# Patient Record
Sex: FEMALE | Race: WHITE | NOT HISPANIC OR LATINO | Employment: OTHER
[De-identification: names, ages, dates, MRNs, and addresses within clinical notes are randomized per-mention and may not be internally consistent; named-entity substitution may affect disease eponyms.]

---

## 2020-01-29 ENCOUNTER — TRANSCRIBE ORDERS (OUTPATIENT)
Dept: OTHER | Age: 70
End: 2020-01-29

## 2020-01-29 DIAGNOSIS — R20.0 NUMBNESS IN BOTH HANDS: Primary | ICD-10-CM

## 2021-07-01 ENCOUNTER — RECORDS - HEALTHEAST (OUTPATIENT)
Dept: LAB | Facility: CLINIC | Age: 71
End: 2021-07-01

## 2021-07-01 LAB
SARS-COV-2 PCR COMMENT: NORMAL
SARS-COV-2 RNA SPEC QL NAA+PROBE: NEGATIVE
SARS-COV-2 VIRUS SPECIMEN SOURCE: NORMAL

## 2021-07-26 ENCOUNTER — LAB REQUISITION (OUTPATIENT)
Dept: LAB | Facility: CLINIC | Age: 71
End: 2021-07-26
Payer: MEDICARE

## 2021-07-26 DIAGNOSIS — R60.9 EDEMA, UNSPECIFIED: ICD-10-CM

## 2021-07-26 DIAGNOSIS — R60.1 GENERALIZED EDEMA: ICD-10-CM

## 2021-07-27 LAB
ANION GAP SERPL CALCULATED.3IONS-SCNC: 10 MMOL/L (ref 5–18)
BUN SERPL-MCNC: 12 MG/DL (ref 8–28)
CALCIUM SERPL-MCNC: 9.3 MG/DL (ref 8.5–10.5)
CHLORIDE BLD-SCNC: 107 MMOL/L (ref 98–107)
CO2 SERPL-SCNC: 25 MMOL/L (ref 22–31)
CREAT SERPL-MCNC: 0.68 MG/DL (ref 0.6–1.1)
GFR SERPL CREATININE-BSD FRML MDRD: 89 ML/MIN/1.73M2
GLUCOSE BLD-MCNC: 103 MG/DL (ref 70–125)
MAGNESIUM SERPL-MCNC: 1.9 MG/DL (ref 1.8–2.6)
POTASSIUM BLD-SCNC: 4.4 MMOL/L (ref 3.5–5)
SODIUM SERPL-SCNC: 142 MMOL/L (ref 136–145)

## 2021-07-27 PROCEDURE — 83735 ASSAY OF MAGNESIUM: CPT | Mod: ORL | Performed by: INTERNAL MEDICINE

## 2021-07-27 PROCEDURE — 36415 COLL VENOUS BLD VENIPUNCTURE: CPT | Performed by: INTERNAL MEDICINE

## 2021-07-27 PROCEDURE — P9604 ONE-WAY ALLOW PRORATED TRIP: HCPCS | Mod: ORL | Performed by: INTERNAL MEDICINE

## 2021-07-27 PROCEDURE — 80048 BASIC METABOLIC PNL TOTAL CA: CPT | Mod: ORL | Performed by: INTERNAL MEDICINE

## 2021-08-02 ENCOUNTER — LAB REQUISITION (OUTPATIENT)
Dept: LAB | Facility: CLINIC | Age: 71
End: 2021-08-02
Payer: MEDICARE

## 2021-08-02 DIAGNOSIS — U07.1 COVID-19: ICD-10-CM

## 2021-08-03 LAB — SARS-COV-2 RNA RESP QL NAA+PROBE: NEGATIVE

## 2021-08-03 PROCEDURE — U0003 INFECTIOUS AGENT DETECTION BY NUCLEIC ACID (DNA OR RNA); SEVERE ACUTE RESPIRATORY SYNDROME CORONAVIRUS 2 (SARS-COV-2) (CORONAVIRUS DISEASE [COVID-19]), AMPLIFIED PROBE TECHNIQUE, MAKING USE OF HIGH THROUGHPUT TECHNOLOGIES AS DESCRIBED BY CMS-2020-01-R: HCPCS | Performed by: FAMILY MEDICINE

## 2021-08-04 ENCOUNTER — LAB REQUISITION (OUTPATIENT)
Dept: LAB | Facility: CLINIC | Age: 71
End: 2021-08-04
Payer: MEDICARE

## 2021-08-04 DIAGNOSIS — U07.1 COVID-19: ICD-10-CM

## 2021-08-05 PROCEDURE — U0003 INFECTIOUS AGENT DETECTION BY NUCLEIC ACID (DNA OR RNA); SEVERE ACUTE RESPIRATORY SYNDROME CORONAVIRUS 2 (SARS-COV-2) (CORONAVIRUS DISEASE [COVID-19]), AMPLIFIED PROBE TECHNIQUE, MAKING USE OF HIGH THROUGHPUT TECHNOLOGIES AS DESCRIBED BY CMS-2020-01-R: HCPCS | Mod: ORL | Performed by: FAMILY MEDICINE

## 2021-08-06 ENCOUNTER — LAB REQUISITION (OUTPATIENT)
Dept: LAB | Facility: CLINIC | Age: 71
End: 2021-08-06
Payer: MEDICARE

## 2021-08-06 DIAGNOSIS — U07.1 COVID-19: ICD-10-CM

## 2021-08-06 LAB — SARS-COV-2 RNA RESP QL NAA+PROBE: NEGATIVE

## 2021-08-09 PROCEDURE — U0005 INFEC AGEN DETEC AMPLI PROBE: HCPCS | Mod: ORL | Performed by: FAMILY MEDICINE

## 2021-08-10 ENCOUNTER — LAB REQUISITION (OUTPATIENT)
Dept: LAB | Facility: CLINIC | Age: 71
End: 2021-08-10
Payer: MEDICARE

## 2021-08-10 DIAGNOSIS — U07.1 COVID-19: ICD-10-CM

## 2021-08-10 LAB — SARS-COV-2 RNA RESP QL NAA+PROBE: NEGATIVE

## 2021-08-12 ENCOUNTER — LAB REQUISITION (OUTPATIENT)
Dept: LAB | Facility: CLINIC | Age: 71
End: 2021-08-12

## 2021-08-12 DIAGNOSIS — U07.1 COVID-19: ICD-10-CM

## 2021-08-12 PROCEDURE — U0003 INFECTIOUS AGENT DETECTION BY NUCLEIC ACID (DNA OR RNA); SEVERE ACUTE RESPIRATORY SYNDROME CORONAVIRUS 2 (SARS-COV-2) (CORONAVIRUS DISEASE [COVID-19]), AMPLIFIED PROBE TECHNIQUE, MAKING USE OF HIGH THROUGHPUT TECHNOLOGIES AS DESCRIBED BY CMS-2020-01-R: HCPCS | Performed by: FAMILY MEDICINE

## 2021-08-13 LAB — SARS-COV-2 RNA RESP QL NAA+PROBE: NEGATIVE

## 2021-08-16 ENCOUNTER — LAB REQUISITION (OUTPATIENT)
Dept: LAB | Facility: CLINIC | Age: 71
End: 2021-08-16
Payer: MEDICARE

## 2021-08-16 DIAGNOSIS — R60.9 EDEMA, UNSPECIFIED: ICD-10-CM

## 2021-08-17 ENCOUNTER — LAB REQUISITION (OUTPATIENT)
Dept: LAB | Facility: CLINIC | Age: 71
End: 2021-08-17
Payer: MEDICARE

## 2021-08-17 ENCOUNTER — LAB REQUISITION (OUTPATIENT)
Dept: LAB | Facility: CLINIC | Age: 71
End: 2021-08-17

## 2021-08-17 DIAGNOSIS — U07.1 COVID-19: ICD-10-CM

## 2021-08-17 PROCEDURE — U0003 INFECTIOUS AGENT DETECTION BY NUCLEIC ACID (DNA OR RNA); SEVERE ACUTE RESPIRATORY SYNDROME CORONAVIRUS 2 (SARS-COV-2) (CORONAVIRUS DISEASE [COVID-19]), AMPLIFIED PROBE TECHNIQUE, MAKING USE OF HIGH THROUGHPUT TECHNOLOGIES AS DESCRIBED BY CMS-2020-01-R: HCPCS | Mod: ORL | Performed by: FAMILY MEDICINE

## 2021-08-18 ENCOUNTER — LAB REQUISITION (OUTPATIENT)
Dept: LAB | Facility: CLINIC | Age: 71
End: 2021-08-18
Payer: MEDICARE

## 2021-08-18 LAB
ANION GAP SERPL CALCULATED.3IONS-SCNC: 9 MMOL/L (ref 5–18)
BUN SERPL-MCNC: 12 MG/DL (ref 8–28)
CALCIUM SERPL-MCNC: 9.4 MG/DL (ref 8.5–10.5)
CHLORIDE BLD-SCNC: 106 MMOL/L (ref 98–107)
CO2 SERPL-SCNC: 26 MMOL/L (ref 22–31)
CREAT SERPL-MCNC: 0.65 MG/DL (ref 0.6–1.1)
GFR SERPL CREATININE-BSD FRML MDRD: 90 ML/MIN/1.73M2
GLUCOSE BLD-MCNC: 105 MG/DL (ref 70–125)
POTASSIUM BLD-SCNC: 4.5 MMOL/L (ref 3.5–5)
SARS-COV-2 RNA RESP QL NAA+PROBE: NEGATIVE
SODIUM SERPL-SCNC: 141 MMOL/L (ref 136–145)
T4 FREE SERPL-MCNC: 0.78 NG/DL (ref 0.7–1.8)
TSH SERPL DL<=0.005 MIU/L-ACNC: 2.68 UIU/ML (ref 0.3–5)

## 2021-08-18 PROCEDURE — 84439 ASSAY OF FREE THYROXINE: CPT | Performed by: INTERNAL MEDICINE

## 2021-08-18 PROCEDURE — 84443 ASSAY THYROID STIM HORMONE: CPT | Mod: ORL | Performed by: INTERNAL MEDICINE

## 2021-08-18 PROCEDURE — P9604 ONE-WAY ALLOW PRORATED TRIP: HCPCS | Mod: ORL | Performed by: INTERNAL MEDICINE

## 2021-08-18 PROCEDURE — 80048 BASIC METABOLIC PNL TOTAL CA: CPT | Performed by: INTERNAL MEDICINE

## 2021-08-18 PROCEDURE — 36415 COLL VENOUS BLD VENIPUNCTURE: CPT | Mod: ORL | Performed by: INTERNAL MEDICINE

## 2021-08-19 PROCEDURE — U0005 INFEC AGEN DETEC AMPLI PROBE: HCPCS | Mod: ORL | Performed by: FAMILY MEDICINE

## 2021-08-20 ENCOUNTER — LAB REQUISITION (OUTPATIENT)
Dept: LAB | Facility: CLINIC | Age: 71
End: 2021-08-20
Payer: MEDICARE

## 2021-08-20 DIAGNOSIS — U07.1 COVID-19: ICD-10-CM

## 2021-08-20 LAB — SARS-COV-2 RNA RESP QL NAA+PROBE: NEGATIVE

## 2021-08-24 ENCOUNTER — LAB REQUISITION (OUTPATIENT)
Dept: LAB | Facility: CLINIC | Age: 71
End: 2021-08-24
Payer: MEDICARE

## 2021-08-24 DIAGNOSIS — U07.1 COVID-19: ICD-10-CM

## 2021-08-24 PROCEDURE — U0003 INFECTIOUS AGENT DETECTION BY NUCLEIC ACID (DNA OR RNA); SEVERE ACUTE RESPIRATORY SYNDROME CORONAVIRUS 2 (SARS-COV-2) (CORONAVIRUS DISEASE [COVID-19]), AMPLIFIED PROBE TECHNIQUE, MAKING USE OF HIGH THROUGHPUT TECHNOLOGIES AS DESCRIBED BY CMS-2020-01-R: HCPCS | Mod: ORL | Performed by: FAMILY MEDICINE

## 2021-08-25 LAB — SARS-COV-2 RNA RESP QL NAA+PROBE: NEGATIVE

## 2021-08-26 PROCEDURE — U0003 INFECTIOUS AGENT DETECTION BY NUCLEIC ACID (DNA OR RNA); SEVERE ACUTE RESPIRATORY SYNDROME CORONAVIRUS 2 (SARS-COV-2) (CORONAVIRUS DISEASE [COVID-19]), AMPLIFIED PROBE TECHNIQUE, MAKING USE OF HIGH THROUGHPUT TECHNOLOGIES AS DESCRIBED BY CMS-2020-01-R: HCPCS | Performed by: FAMILY MEDICINE

## 2021-08-27 ENCOUNTER — LAB REQUISITION (OUTPATIENT)
Dept: LAB | Facility: CLINIC | Age: 71
End: 2021-08-27

## 2021-08-27 DIAGNOSIS — U07.1 COVID-19: ICD-10-CM

## 2021-08-27 LAB — SARS-COV-2 RNA RESP QL NAA+PROBE: NEGATIVE

## 2021-08-30 ENCOUNTER — LAB REQUISITION (OUTPATIENT)
Dept: LAB | Facility: CLINIC | Age: 71
End: 2021-08-30
Payer: MEDICARE

## 2021-08-30 DIAGNOSIS — U07.1 COVID-19: ICD-10-CM

## 2021-08-30 PROCEDURE — U0003 INFECTIOUS AGENT DETECTION BY NUCLEIC ACID (DNA OR RNA); SEVERE ACUTE RESPIRATORY SYNDROME CORONAVIRUS 2 (SARS-COV-2) (CORONAVIRUS DISEASE [COVID-19]), AMPLIFIED PROBE TECHNIQUE, MAKING USE OF HIGH THROUGHPUT TECHNOLOGIES AS DESCRIBED BY CMS-2020-01-R: HCPCS | Performed by: FAMILY MEDICINE

## 2021-08-31 LAB — SARS-COV-2 RNA RESP QL NAA+PROBE: NEGATIVE

## 2021-09-01 PROCEDURE — U0005 INFEC AGEN DETEC AMPLI PROBE: HCPCS | Mod: ORL | Performed by: FAMILY MEDICINE

## 2021-09-02 LAB — SARS-COV-2 RNA RESP QL NAA+PROBE: NEGATIVE

## 2022-09-02 LAB
ALT SERPL-CCNC: 16 U/L (ref 6–29)
AST SERPL-CCNC: 19 U/L (ref 10–35)

## 2023-03-03 LAB
ALT SERPL-CCNC: 17 U/L (ref 6–29)
AST SERPL-CCNC: 20 U/L (ref 10–35)
HBA1C MFR BLD: 5.7 %
TSH SERPL-ACNC: 2.7 MIU/L (ref 0.4–4.5)

## 2023-03-07 ENCOUNTER — TRANSFERRED RECORDS (OUTPATIENT)
Dept: HEALTH INFORMATION MANAGEMENT | Facility: CLINIC | Age: 73
End: 2023-03-07

## 2023-03-21 ENCOUNTER — TRANSFERRED RECORDS (OUTPATIENT)
Dept: HEALTH INFORMATION MANAGEMENT | Facility: CLINIC | Age: 73
End: 2023-03-21

## 2023-05-03 ENCOUNTER — TRANSFERRED RECORDS (OUTPATIENT)
Dept: HEALTH INFORMATION MANAGEMENT | Facility: CLINIC | Age: 73
End: 2023-05-03

## 2023-10-16 ENCOUNTER — OFFICE VISIT (OUTPATIENT)
Dept: FAMILY MEDICINE | Facility: CLINIC | Age: 73
End: 2023-10-16
Payer: MEDICARE

## 2023-10-16 VITALS
SYSTOLIC BLOOD PRESSURE: 176 MMHG | OXYGEN SATURATION: 93 % | HEART RATE: 68 BPM | BODY MASS INDEX: 44.83 KG/M2 | RESPIRATION RATE: 16 BRPM | WEIGHT: 253 LBS | TEMPERATURE: 98.3 F | DIASTOLIC BLOOD PRESSURE: 93 MMHG | HEIGHT: 63 IN

## 2023-10-16 DIAGNOSIS — Z87.19 HISTORY OF GALLSTONES: ICD-10-CM

## 2023-10-16 DIAGNOSIS — E04.1 THYROID NODULE: ICD-10-CM

## 2023-10-16 DIAGNOSIS — Z53.20 COLON CANCER SCREENING DECLINED: ICD-10-CM

## 2023-10-16 DIAGNOSIS — E66.813 CLASS 3 OBESITY: ICD-10-CM

## 2023-10-16 DIAGNOSIS — Z00.00 ENCOUNTER FOR MEDICARE ANNUAL WELLNESS EXAM: Primary | ICD-10-CM

## 2023-10-16 DIAGNOSIS — Z53.20 MAMMOGRAM DECLINED: ICD-10-CM

## 2023-10-16 DIAGNOSIS — Z13.220 LIPID SCREENING: ICD-10-CM

## 2023-10-16 DIAGNOSIS — G99.2 STENOSIS OF CERVICAL SPINE WITH MYELOPATHY (H): ICD-10-CM

## 2023-10-16 DIAGNOSIS — M48.02 STENOSIS OF CERVICAL SPINE WITH MYELOPATHY (H): ICD-10-CM

## 2023-10-16 DIAGNOSIS — R10.11 RUQ ABDOMINAL PAIN: ICD-10-CM

## 2023-10-16 DIAGNOSIS — R03.0 ELEVATED BLOOD PRESSURE READING WITHOUT DIAGNOSIS OF HYPERTENSION: ICD-10-CM

## 2023-10-16 DIAGNOSIS — R20.2 TINGLING OF BOTH FEET: ICD-10-CM

## 2023-10-16 LAB
ERYTHROCYTE [DISTWIDTH] IN BLOOD BY AUTOMATED COUNT: 14.5 % (ref 10–15)
HCT VFR BLD AUTO: 42.6 % (ref 35–47)
HGB BLD-MCNC: 15.1 G/DL (ref 11.7–15.7)
MCH RBC QN AUTO: 33.3 PG (ref 26.5–33)
MCHC RBC AUTO-ENTMCNC: 35.4 G/DL (ref 31.5–36.5)
MCV RBC AUTO: 94 FL (ref 78–100)
PLATELET # BLD AUTO: 165 10E3/UL (ref 150–450)
RBC # BLD AUTO: 4.54 10E6/UL (ref 3.8–5.2)
WBC # BLD AUTO: 3.6 10E3/UL (ref 4–11)

## 2023-10-16 PROCEDURE — G0439 PPPS, SUBSEQ VISIT: HCPCS | Performed by: NURSE PRACTITIONER

## 2023-10-16 PROCEDURE — 80053 COMPREHEN METABOLIC PANEL: CPT | Performed by: NURSE PRACTITIONER

## 2023-10-16 PROCEDURE — 82607 VITAMIN B-12: CPT | Performed by: NURSE PRACTITIONER

## 2023-10-16 PROCEDURE — 99204 OFFICE O/P NEW MOD 45 MIN: CPT | Mod: 25 | Performed by: NURSE PRACTITIONER

## 2023-10-16 PROCEDURE — 84443 ASSAY THYROID STIM HORMONE: CPT | Performed by: NURSE PRACTITIONER

## 2023-10-16 PROCEDURE — 36415 COLL VENOUS BLD VENIPUNCTURE: CPT | Performed by: NURSE PRACTITIONER

## 2023-10-16 PROCEDURE — 90662 IIV NO PRSV INCREASED AG IM: CPT | Performed by: NURSE PRACTITIONER

## 2023-10-16 PROCEDURE — 80061 LIPID PANEL: CPT | Performed by: NURSE PRACTITIONER

## 2023-10-16 PROCEDURE — 90480 ADMN SARSCOV2 VAC 1/ONLY CMP: CPT | Performed by: NURSE PRACTITIONER

## 2023-10-16 PROCEDURE — G0008 ADMIN INFLUENZA VIRUS VAC: HCPCS | Performed by: NURSE PRACTITIONER

## 2023-10-16 PROCEDURE — 85027 COMPLETE CBC AUTOMATED: CPT | Performed by: NURSE PRACTITIONER

## 2023-10-16 PROCEDURE — 83690 ASSAY OF LIPASE: CPT | Performed by: NURSE PRACTITIONER

## 2023-10-16 PROCEDURE — 82248 BILIRUBIN DIRECT: CPT | Performed by: NURSE PRACTITIONER

## 2023-10-16 PROCEDURE — 91320 SARSCV2 VAC 30MCG TRS-SUC IM: CPT | Performed by: NURSE PRACTITIONER

## 2023-10-16 RX ORDER — AMITRIPTYLINE HYDROCHLORIDE 10 MG/1
20 TABLET ORAL AT BEDTIME
COMMUNITY

## 2023-10-16 RX ORDER — TIZANIDINE 2 MG/1
2-4 TABLET ORAL
COMMUNITY

## 2023-10-16 RX ORDER — PREGABALIN 150 MG/1
CAPSULE ORAL
COMMUNITY

## 2023-10-16 RX ORDER — OXYCODONE HYDROCHLORIDE 5 MG/1
5 TABLET ORAL WEEKLY
COMMUNITY

## 2023-10-16 RX ORDER — BACLOFEN 5 MG/1
5 TABLET ORAL
COMMUNITY

## 2023-10-16 NOTE — PROGRESS NOTES
Assessment & Plan     Thyroid nodule    - US Thyroid  - TSH with free T4 reflex  - Basic metabolic panel  - TSH with free T4 reflex  - Basic metabolic panel    Lipid screening    - Lipid panel reflex to direct LDL Non-fasting  - Lipid panel reflex to direct LDL Non-fasting    Encounter for Medicare annual wellness exam      Tingling of both feet    - Vitamin B12  - Vitamin B12    Mammogram declined      Colon cancer screening declined      RUQ abdominal pain    - US Abdomen Complete    History of gallstones    - US Abdomen Complete    Stenosis of cervical spine with myelopathy (H)      Class 3 obesity (H)      - BP elevated today. She stated office visits make her nervous, and BP at home is normal range (120/80 range). I will see if she would consider home BP monitoring to ensure white coat syndrome via messaging. She will let me know via [employer].   - she will continue with pain management clinics.   - weight loss and exercise encouraged. Went over low salt diet.   - Stated her Thyroid was noted enlarged in a US. FNA negative in 2019.   - Intermittent RUQ pain. Your liver enzymes are within a normal range. Your lipase is just right outside the normal range. If you have acute pancreatitis, this is typically triple this level. You have no other symptoms such as fevers, weight loss, etc, which is good news! I ordered the US of the abdomen for further evaluation. I would like to recheck your lipase level within one month to assess if more elevated, or certainly get seen if symptoms worsen.   - your cholesterol is 222. Your HDL is low at 36. Your 10 year ASCVD score is elevated. Would you consider a statin medication? Please let me know via [employer].   The 10-year ASCVD risk score (Alejandro PIKE, et al., 2019) is: 23.9%    Values used to calculate the score:      Age: 73 years      Sex: Female      Is Non- : No      Diabetic: No      Tobacco smoker: No      Systolic Blood Pressure: 176 mmHg      Is  "BP treated: No      HDL Cholesterol: 36 mg/dL      Total Cholesterol: 222 mg/dL    - Your b12 level is normal! Your TSH level is normal. Your CBC is overall stable.   - weight loss and low salt diet encouraged today. She stated she does enjoy salty foods.   - declined HM items. Benefits discussed.   - Chronic, multiple conditions, follow up in three months.   - I offered A1c testing, she declined.        BMI:   Estimated body mass index is 44.82 kg/m  as calculated from the following:    Height as of this encounter: 1.6 m (5' 3\").    Weight as of this encounter: 114.8 kg (253 lb).   Weight management plan: Discussed healthy diet and exercise guidelines    OKSANA Edwards CNP  [facility]   Meredith is a 73 year old, presenting for the following health issues:  Consult ([facility] bladder issue) and Physical (Annual Visit.)      10/16/2023    12:34 PM   Additional Questions   Roomed by Chi   Accompanied by Son     - present with her son. She is present to establish care.   - she stated office visits make her nervous and she checks her BP at home and it is 120/80 range. She feels her cuff is accurate. No heart history. She does get swelling in her ankles and stated she does like eating foods that are salty.   - has chronic RUQ pain but not today. She denied fevers, night sweats, weight loss, vomiting, etc. She stated on previous back/thoracic scans, it is does show gallstones. She denied any urinary changes.   - Not as active as she liked to be related to her chronic pain. If she walks or stands too long, she is more prone to be off balance, and higher risk for falls. No recent fall. She uses a walking walker. She has a HO cervical spinal stenosis, lumbar radiculopathy, lumbar spondylosis and widespread chronic pain. Her chronic pain management is currently through [facility] She has been very pleased with her care and has made several improvements with pain management. " "She had a spinal cord stimulator which does help. She gets muscle spasms throughout the day in which she takes baclofen, Tizanidine, and Lyrica. She also takes oxycodone PRN and amitripyline/medical cannibus at night. Her mental health is stable.  - can get numbness or tingling.   -She finds the most improvement with stretching and movement.       History of Present Illness       Reason for visit:  Establish primary care provider. Follow gallbladder concern    She eats 4 or more servings of fruits and vegetables daily.She consumes 0 sweetened beverage(s) daily. She exercises with enough effort to increase her heart rate 3 or less days per week. She is missing 1 dose(s) of medications per week.  She is not taking prescribed medications regularly due to remembering to take and other.         Annual Wellness Visit  {  Patient has been advised of split billing requirements and indicates understanding: Yes     Are you in the first 12 months of your Medicare Part B coverage?  No    Physical Health:  In general, how would you rate your overall physical health? good  Outside of work, how many days during the week do you exercise?none  Outside of work, approximately how many minutes a day do you exercise?not applicable  If you drink alcohol do you typically have >3 drinks per day or >7 drinks per week? No  Do you usually eat at least 4 servings of fruit and vegetables a day, include whole grains & fiber and avoid regularly eating high fat or \"junk\" foods? Yes  Do you have any problems taking medications regularly? No  Do you have any side effects from medications? none  Needs assistance for the following daily activities: no assistance needed  Which of the following safety concerns are present in your home?  none identified   Hearing impairment: No  In the past 6 months, have you been bothered by leaking of urine? no    Mental Health:  In general, how would you rate your overall mental or emotional health? good    Today's " PHQ-2 Score:       10/16/2023    12:27 PM   PHQ-2 (  [employer])   Q1: Little interest or pleasure in doing things 0   Q2: Feeling down, depressed or hopeless 0   PHQ-2 Score 0   Q1: Little interest or pleasure in doing things Not at all   Q2: Feeling down, depressed or hopeless Not at all   PHQ-2 Score 0         Do you feel safe in your environment? Yes    Have you ever done Advance Care Planning? (For example, a Health Directive, POLST, or a discussion with a medical provider or your loved ones about your wishes)? Yes, patient states has an Advance Care Planning document and will bring a copy to the clinic.    Fall risk:  Fallen 2 or more times in the past year?: No  Any fall with injury in the past year?: No  no  Cognitive Screenin) Repeat 3 items (Leader, Season, Table)    2) Clock draw:  Declined  3) 3 item recall: Recalls 3 objects  Results: 3 items recalled: COGNITIVE IMPAIRMENT LESS LIKELY    Mini-CogTM Copyright S [place]. Licensed by the author for use in [facility]; reprinted with permission (diogenes@[facility]). All rights reserved.      Do you have sleep apnea, excessive snoring or daytime drowsiness? : no    Social History     Tobacco Use    Smoking status: Never    Smokeless tobacco: Never   Substance Use Topics    Alcohol use: Not on file              No data to display                   No data to display              Do you have a current opioid prescription? No  Do you use any other controlled substances or medications that are not prescribed by a provider? None    Current providers sharing in care for this patient include:   Patient Care Team:  Felicia Mccarthy APRN CNP as PCP - General (Family Medicine)    The following health maintenance items are reviewed in Epic and correct as of today:  Health Maintenance   Topic Date Due    DEXA  Never done    MAMMO SCREENING  Never done    COLORECTAL CANCER SCREENING  Never done    HEPATITIS C SCREENING  Never done    ZOSTER IMMUNIZATION (1  "of 2) Never done    RSV VACCINE 60+ (1 - 1-dose 60+ series) Never done    MEDICARE ANNUAL WELLNESS VISIT  Never done    Pneumococcal Vaccine: 65+ Years (2 - PCV) 07/02/2022    DTAP/TDAP/TD IMMUNIZATION (3 - Td or Tdap) 09/01/2023    ANNUAL REVIEW OF HM ORDERS  10/16/2024    FALL RISK ASSESSMENT  10/16/2024    LIPID  10/16/2028    ADVANCE CARE PLANNING  10/19/2028    PHQ-2 (once per calendar year)  Completed    INFLUENZA VACCINE  Completed    COVID-19 Vaccine  Completed    IPV IMMUNIZATION  Aged Out    HPV IMMUNIZATION  Aged Out    MENINGITIS IMMUNIZATION  Aged Out       Patient has been advised of split billing requirements and indicates understanding: Yes    Appropriate preventive services were discussed with this patient, including applicable screening as appropriate for fall prevention, nutrition, physical activity, Tobacco-use cessation, weight loss and cognition.  Checklist reviewing preventive services available has been given to the patient.        Review of Systems         Objective    BP (!) 176/93   Pulse 68   Temp 98.3  F (36.8  C) (Oral)   Resp 16   Ht 1.6 m (5' 3\")   Wt 114.8 kg (253 lb)   SpO2 93%   BMI 44.82 kg/m    Body mass index is 44.82 kg/m .  Physical Exam  Vitals and nursing note reviewed.   Constitutional:       General: She is not in acute distress.     Appearance: Normal appearance. She is obese. She is not ill-appearing, toxic-appearing or diaphoretic.   HENT:      Mouth/Throat:      Mouth: Mucous membranes are moist.   Cardiovascular:      Rate and Rhythm: Normal rate and regular rhythm.   Pulmonary:      Effort: Pulmonary effort is normal.      Breath sounds: Normal breath sounds.   Abdominal:      General: There is no distension.      Palpations: Abdomen is soft. There is no mass.      Tenderness: There is no abdominal tenderness. There is no right CVA tenderness, left CVA tenderness, guarding or rebound.      Hernia: No hernia is present.   Musculoskeletal:      Cervical back: No " rigidity.      Right lower leg: Edema present.      Left lower leg: Edema present.      Comments: Non-pitting symmetric edema noted. Steady gait   Skin:     Capillary Refill: Capillary refill takes less than 2 seconds.   Neurological:      General: No focal deficit present.      Mental Status: She is alert and oriented to person, place, and time.   Psychiatric:         Mood and Affect: Mood normal.         Behavior: Behavior normal.         Thought Content: Thought content normal.         Judgment: Judgment normal.            Results for orders placed or performed in visit on 10/16/23   Lipid panel reflex to direct LDL Non-fasting     Status: Abnormal   Result Value Ref Range    Cholesterol 222 (H) <200 mg/dL    Triglycerides 204 (H) <150 mg/dL    Direct Measure HDL 36 (L) >=50 mg/dL    LDL Cholesterol Calculated 145 (H) <=100 mg/dL    Non HDL Cholesterol 186 (H) <130 mg/dL    Narrative    Cholesterol  Desirable:  <200 mg/dL    Triglycerides  Normal:  Less than 150 mg/dL  Borderline High:  150-199 mg/dL  High:  200-499 mg/dL  Very High:  Greater than or equal to 500 mg/dL    Direct Measure HDL  Female:  Greater than or equal to 50 mg/dL   Male:  Greater than or equal to 40 mg/dL    LDL Cholesterol  Desirable:  <100mg/dL  Above Desirable:  100-129 mg/dL   Borderline High:  130-159 mg/dL   High:  160-189 mg/dL   Very High:  >= 190 mg/dL    Non HDL Cholesterol  Desirable:  130 mg/dL  Above Desirable:  130-159 mg/dL  Borderline High:  160-189 mg/dL  High:  190-219 mg/dL  Very High:  Greater than or equal to 220 mg/dL   TSH with free T4 reflex     Status: Normal   Result Value Ref Range    TSH 1.81 0.30 - 4.20 uIU/mL   Basic metabolic panel     Status: Abnormal   Result Value Ref Range    Sodium 140 135 - 145 mmol/L    Potassium 5.1 3.4 - 5.3 mmol/L    Chloride 106 98 - 107 mmol/L    Carbon Dioxide (CO2) 23 22 - 29 mmol/L    Anion Gap 11 7 - 15 mmol/L    Urea Nitrogen 13.3 8.0 - 23.0 mg/dL    Creatinine 0.77 0.51 - 0.95  mg/dL    GFR Estimate 81 >60 mL/min/1.73m2    Calcium 9.5 8.8 - 10.2 mg/dL    Glucose 127 (H) 70 - 99 mg/dL   Hepatic panel (Albumin, ALT, AST, Bili, Alk Phos, TP)     Status: Normal   Result Value Ref Range    Protein Total 7.1 6.4 - 8.3 g/dL    Albumin 4.3 3.5 - 5.2 g/dL    Bilirubin Total 0.5 <=1.2 mg/dL    Alkaline Phosphatase 47 35 - 104 U/L    AST 25 0 - 45 U/L    ALT 21 0 - 50 U/L    Bilirubin Direct <0.20 0.00 - 0.30 mg/dL   Lipase     Status: Abnormal   Result Value Ref Range    Lipase 61 (H) 13 - 60 U/L   CBC with platelets     Status: Abnormal   Result Value Ref Range    WBC Count 3.6 (L) 4.0 - 11.0 10e3/uL    RBC Count 4.54 3.80 - 5.20 10e6/uL    Hemoglobin 15.1 11.7 - 15.7 g/dL    Hematocrit 42.6 35.0 - 47.0 %    MCV 94 78 - 100 fL    MCH 33.3 (H) 26.5 - 33.0 pg    MCHC 35.4 31.5 - 36.5 g/dL    RDW 14.5 10.0 - 15.0 %    Platelet Count 165 150 - 450 10e3/uL   Vitamin B12     Status: Normal   Result Value Ref Range    Vitamin B12 896 232 - 1,245 pg/mL

## 2023-10-16 NOTE — PATIENT INSTRUCTIONS
"Patient Education   Personalized Prevention Plan  You are due for the preventive services outlined below.  Your care team is available to assist you in scheduling these services.  If you have already completed any of these items, please share that information with your care team to update in your medical record.  Health Maintenance Due   Topic Date Due     Osteoporosis Screening  Never done     Mammogram  Never done     Colorectal Cancer Screening  Never done     Hepatitis C Screening  Never done     Cholesterol Lab  Never done     Zoster (Shingles) Vaccine (1 of 2) Never done     RSV VACCINE 60+ (1 - 1-dose 60+ series) Never done     Annual Wellness Visit  Never done     Pneumococcal Vaccine (2 - PCV) 07/02/2022     Flu Vaccine (1) 09/01/2023     Diptheria Tetanus Pertussis (DTAP/TDAP/TD) Vaccine (3 - Td or Tdap) 09/01/2023     COVID-19 Vaccine (5 - 2023-24 season) 09/01/2023     Learning About Being Physically Active  What is physical activity?     Being physically active means doing any kind of activity that gets your body moving.  The types of physical activity that can help you get fit and stay healthy include:  Aerobic or \"cardio\" activities. These make your heart beat faster and make you breathe harder, such as brisk walking, riding a bike, or running. They strengthen your heart and lungs and build up your endurance.  Strength training activities. These make your muscles work against, or \"resist,\" something. Examples include lifting weights or doing push-ups. These activities help tone and strengthen your muscles and bones.  Stretches. These let you move your joints and muscles through their full range of motion. Stretching helps you be more flexible.  Reaching a balance between these three types of physical activity is important because each one contributes to your overall fitness.  What are the benefits of being active?  Being active is one of the best things you can do for your health. It helps you " "to:  Feel stronger and have more energy to do all the things you like to do.  Focus better at school or work.  Feel, think, and sleep better.  Reach and stay at a healthy weight.  Lose fat and build lean muscle.  Lower your risk for serious health problems, including diabetes, heart attack, high blood pressure, and some cancers.  Keep your heart, lungs, bones, muscles, and joints strong and healthy.  How can you make being active part of your life?  Start slowly. Make it your long-term goal to get at least 30 minutes of exercise on most days of the week. Walking is a good choice. You also may want to do other activities, such as running, swimming, cycling, or playing tennis or team sports.  Pick activities that you like--ones that make your heart beat faster, your muscles stronger, and your muscles and joints more flexible. If you find more than one thing you like doing, do them all. You don't have to do the same thing every day.  Get your heart pumping every day. Any activity that makes your heart beat faster and keeps it at that rate for a while counts.  Here are some great ways to get your heart beating faster:  Go for a brisk walk, run, or bike ride.  Go for a hike or swim.  Go in-line skating.  Play a game of touch football, basketball, or soccer.  Ride a bike.  Play tennis or racquetball.  Climb stairs.  Even some household chores can be aerobic--just do them at a faster pace. Vacuuming, raking or mowing the lawn, sweeping the garage, and washing and waxing the car all can help get your heart rate up.  Strengthen your muscles during the week. You don't have to lift heavy weights or grow big, bulky muscles to get stronger. Doing a few simple activities that make your muscles work against, or \"resist,\" something can help you get stronger.  For example, you can:  Do push-ups or sit-ups, which use your own body weight as resistance.  Lift weights or dumbbells or use stretch bands at home or in a gym or community " "center.  Stretch your muscles often. Stretching will help you as you become more active. It can help you stay flexible, loosen tight muscles, and avoid injury. It can also help improve your balance and posture and can be a great way to relax.  Be sure to stretch the muscles you'll be using when you work out. It's best to warm your muscles slightly before you stretch them. Walk or do some other light aerobic activity for a few minutes, and then start stretching.  When you stretch your muscles:  Do it slowly. Stretching is not about going fast or making sudden movements.  Don't push or bounce during a stretch.  Hold each stretch for at least 15 to 30 seconds, if you can. You should feel a stretch in the muscle, but not pain.  Breathe out as you do the stretch. Then breathe in as you hold the stretch. Don't hold your breath.  If you're worried about how more activity might affect your health, have a checkup before you start. Follow any special advice your doctor gives you for getting a smart start.  Where can you learn more?  Go to https://www.[employer].net/patiented  Enter W332 in the search box to learn more about \"Learning About Being Physically Active.\"  Current as of: October 10, 2022               Content Version: 13.7    3702-3649 [employer].   Care instructions adapted under license by your healthcare professional. If you have questions about a medical condition or this instruction, always ask your healthcare professional. [employer] disclaims any warranty or liability for your use of this information.         "

## 2023-10-17 PROBLEM — M48.02 STENOSIS OF CERVICAL SPINE WITH MYELOPATHY (H): Status: ACTIVE | Noted: 2023-10-17

## 2023-10-17 PROBLEM — E66.813 CLASS 3 OBESITY: Status: ACTIVE | Noted: 2023-10-17

## 2023-10-17 PROBLEM — G99.2 STENOSIS OF CERVICAL SPINE WITH MYELOPATHY (H): Status: ACTIVE | Noted: 2023-10-17

## 2023-10-17 LAB
ALBUMIN SERPL BCG-MCNC: 4.3 G/DL (ref 3.5–5.2)
ALP SERPL-CCNC: 47 U/L (ref 35–104)
ALT SERPL W P-5'-P-CCNC: 21 U/L (ref 0–50)
ANION GAP SERPL CALCULATED.3IONS-SCNC: 11 MMOL/L (ref 7–15)
AST SERPL W P-5'-P-CCNC: 25 U/L (ref 0–45)
BILIRUB DIRECT SERPL-MCNC: <0.2 MG/DL (ref 0–0.3)
BILIRUB SERPL-MCNC: 0.5 MG/DL
BUN SERPL-MCNC: 13.3 MG/DL (ref 8–23)
CALCIUM SERPL-MCNC: 9.5 MG/DL (ref 8.8–10.2)
CHLORIDE SERPL-SCNC: 106 MMOL/L (ref 98–107)
CHOLEST SERPL-MCNC: 222 MG/DL
CREAT SERPL-MCNC: 0.77 MG/DL (ref 0.51–0.95)
DEPRECATED HCO3 PLAS-SCNC: 23 MMOL/L (ref 22–29)
EGFRCR SERPLBLD CKD-EPI 2021: 81 ML/MIN/1.73M2
GLUCOSE SERPL-MCNC: 127 MG/DL (ref 70–99)
HDLC SERPL-MCNC: 36 MG/DL
LDLC SERPL CALC-MCNC: 145 MG/DL
LIPASE SERPL-CCNC: 61 U/L (ref 13–60)
NONHDLC SERPL-MCNC: 186 MG/DL
POTASSIUM SERPL-SCNC: 5.1 MMOL/L (ref 3.4–5.3)
PROT SERPL-MCNC: 7.1 G/DL (ref 6.4–8.3)
SODIUM SERPL-SCNC: 140 MMOL/L (ref 135–145)
TRIGL SERPL-MCNC: 204 MG/DL
TSH SERPL DL<=0.005 MIU/L-ACNC: 1.81 UIU/ML (ref 0.3–4.2)
VIT B12 SERPL-MCNC: 896 PG/ML (ref 232–1245)

## 2023-10-17 NOTE — RESULT ENCOUNTER NOTE
Hubert Harper    It was a pleasure meeting you and your son!    - I know you said your BP is normal at home, but would you consider home BP monitoring for the week to ensure you have white coat syndrome? Please let me know.     - Your liver enzymes are within a normal range. Your lipase is just right outside the normal range. If you have acute pancreatitis, this is typically triple this level. You have no other symptoms such as fevers, weight loss, etc, which is good news! I ordered the US of the abdomen for further evaluation. I would like to recheck your lipase level within one month to assess if more elevated, or certainly get seen if symptoms worsen.     - your cholesterol is 222. Your HDL is low at 36. Your 10 year ASCVD score is elevated. Would you consider a statin medication? Please let me know via [employer].     - Your b12 level is normal! Your TSH level is normal. Your CBC is overall stable.     If you have questions, please let me know. Let me know about the statin and BP monitoring.     Kristina

## 2023-10-20 PROBLEM — M54.41 CHRONIC BILATERAL LOW BACK PAIN WITH BILATERAL SCIATICA: Status: ACTIVE | Noted: 2019-05-02

## 2023-10-20 PROBLEM — M48.061 LUMBAR FORAMINAL STENOSIS: Status: ACTIVE | Noted: 2018-04-17

## 2023-10-20 PROBLEM — M54.16 LUMBAR RADICULOPATHY: Status: ACTIVE | Noted: 2018-06-22

## 2023-10-20 PROBLEM — G62.9 PERIPHERAL NEUROPATHY: Status: ACTIVE | Noted: 2023-04-09

## 2023-10-20 PROBLEM — F43.89 OTHER REACTIONS TO SEVERE STRESS: Status: ACTIVE | Noted: 2022-01-26

## 2023-10-20 PROBLEM — G60.9 HEREDITARY AND IDIOPATHIC PERIPHERAL NEUROPATHY: Status: ACTIVE | Noted: 2021-06-18

## 2023-10-20 PROBLEM — M54.42 CHRONIC BILATERAL LOW BACK PAIN WITH BILATERAL SCIATICA: Status: ACTIVE | Noted: 2019-05-02

## 2023-10-20 PROBLEM — M47.816 LUMBAR SPONDYLOSIS: Status: ACTIVE | Noted: 2018-04-17

## 2023-10-20 PROBLEM — R25.2 SPASTICITY: Status: ACTIVE | Noted: 2021-06-17

## 2023-10-20 PROBLEM — E04.1 THYROID NODULE: Status: ACTIVE | Noted: 2018-07-09

## 2023-10-20 PROBLEM — G89.29 CHRONIC BILATERAL LOW BACK PAIN WITH BILATERAL SCIATICA: Status: ACTIVE | Noted: 2019-05-02

## 2023-10-20 PROBLEM — G89.29 OTHER CHRONIC PAIN: Status: ACTIVE | Noted: 2021-10-13

## 2023-10-20 PROBLEM — G95.9 DISEASE OF SPINAL CORD (H): Status: ACTIVE | Noted: 2021-06-17

## 2023-10-23 ENCOUNTER — HOSPITAL ENCOUNTER (OUTPATIENT)
Dept: ULTRASOUND IMAGING | Facility: CLINIC | Age: 73
Discharge: HOME | End: 2023-10-23
Attending: NURSE PRACTITIONER
Payer: MEDICARE

## 2023-10-23 DIAGNOSIS — E04.1 THYROID NODULE: ICD-10-CM

## 2023-10-23 DIAGNOSIS — Z87.19 HISTORY OF GALLSTONES: ICD-10-CM

## 2023-10-23 DIAGNOSIS — R10.11 RUQ ABDOMINAL PAIN: ICD-10-CM

## 2023-10-23 PROCEDURE — 76536 US EXAM OF HEAD AND NECK: CPT

## 2023-10-23 PROCEDURE — 76705 ECHO EXAM OF ABDOMEN: CPT

## 2023-10-23 NOTE — RESULT ENCOUNTER NOTE
Hubert Harper    Your US of the thyroid appears stable. They recommend follow up in one year.     Kristina

## 2023-10-23 NOTE — RESULT ENCOUNTER NOTE
Hubert Harper    Your US is still showing gallstones, but not showing acute inflammation. Good news!    It is noting fatty liver disease. Weight loss is a great treatment for this condition, so akhil work on lowering your carbohydrates in your diet. If you would like to follow up with nutritionist, please let me know.     Any questions? Please let me know.     Kristina

## 2023-10-29 ENCOUNTER — HEALTH MAINTENANCE LETTER (OUTPATIENT)
Age: 73
End: 2023-10-29

## 2024-09-10 ENCOUNTER — OFFICE VISIT (OUTPATIENT)
Dept: FAMILY MEDICINE | Facility: CLINIC | Age: 74
End: 2024-09-10
Payer: MEDICARE

## 2024-09-10 VITALS
OXYGEN SATURATION: 97 % | TEMPERATURE: 98.6 F | RESPIRATION RATE: 12 BRPM | SYSTOLIC BLOOD PRESSURE: 156 MMHG | DIASTOLIC BLOOD PRESSURE: 74 MMHG | HEART RATE: 92 BPM

## 2024-09-10 DIAGNOSIS — E04.1 THYROID NODULE: ICD-10-CM

## 2024-09-10 DIAGNOSIS — B37.2 YEAST INFECTION OF THE SKIN: ICD-10-CM

## 2024-09-10 DIAGNOSIS — N81.4 UTERINE PROLAPSE: ICD-10-CM

## 2024-09-10 PROCEDURE — 99459 PELVIC EXAMINATION: CPT | Performed by: NURSE PRACTITIONER

## 2024-09-10 PROCEDURE — G2211 COMPLEX E/M VISIT ADD ON: HCPCS | Performed by: NURSE PRACTITIONER

## 2024-09-10 PROCEDURE — 99214 OFFICE O/P EST MOD 30 MIN: CPT | Performed by: NURSE PRACTITIONER

## 2024-09-10 RX ORDER — NYSTATIN 100000 U/G
CREAM TOPICAL 2 TIMES DAILY
Qty: 30 G | Refills: 1 | Status: SHIPPED | OUTPATIENT
Start: 2024-09-10

## 2024-09-10 NOTE — PROGRESS NOTES
"  Assessment & Plan     Yeast infection of the skin  Discussed focusing on keeping groin area clean and dry.   Can apply nystatin twice a day until symptoms improve.  Follow up if not improving.    - nystatin (MYCOSTATIN) 695201 UNIT/GM external cream; Apply topically 2 times daily.    BMI 40.0-44.9, adult (H)  I do think patient would benefit from weight loss. We did discuss that insurance may not cover wegovy however I will attempt to send in.    - Semaglutide-Weight Management (WEGOVY) 0.25 MG/0.5ML pen; Inject 0.25 mg subcutaneously once a week.    Thyroid nodule  Ultrasound performed last year showed stable nodule. Recommended recheck in 1 year-I have ordered this for patient.    - US Thyroid; Future    Uterine prolapse  Patient would like to talk through options of prolapse with gynecology/urology. I have placed referral for this.     - Adult Urology  Referral; Future          BMI  Estimated body mass index is 44.82 kg/m  as calculated from the following:    Height as of 10/16/23: 1.6 m (5' 3\").    Weight as of 10/16/23: 114.8 kg (253 lb).             Niraj Harper is a 74 year old, presenting for the following health issues:  vaginal issue (Vaginal and pubic skin irritation has been ongoing for several weeks.  Discuss vaginal prolapse.)        9/10/2024     3:54 PM   Additional Questions   Roomed by TRISH GROSS     History of Present Illness       Reason for visit:  Suspected fungal infection/pubic /leg folds area. Need evaluation of vaginal prolapse that is becoming difficult to manage  Symptom onset:  More than a month  Symptoms include:  Burning and itching in groin area including labia. Prolapse beginning to make it difficul to pass urine  Symptom intensity:  Moderate  Symptom progression:  Improving  Had these symptoms before:  Yes  Has tried/received treatment for these symptoms:  Yes  Previous treatment was successful:  Yes  Prior treatment description:  Nystatin powder  What makes it " worse:  No  What makes it better:  Rinsing areas with cool water and using barriers such as A and D ointmnent She is missing 1 dose(s) of medications per week.  She is not taking prescribed medications regularly due to other.     Prolapse has gotten worse over the last couple of years.   No urinary tract infection symptoms.   Labia feels inflamed and irritated. With neurological stuff can sometimes miss symptoms.  Patient had left over nystatin. Seemed like it helped a little bet but ran out.  Diflucan was not helpful.    No currently on blood pressure medication.                   Objective    BP (!) 156/74   Pulse 92   Temp 98.6  F (37  C) (Oral)   Resp 12   SpO2 97%   There is no height or weight on file to calculate BMI.  Physical Exam  Constitutional:       Appearance: Normal appearance.   Genitourinary:     Pubic Area: Rash (erythema) present.      Labia:         Right: Rash present.       Uterus: With uterine prolapse (cervix at vaginal introitus).    Neurological:      General: No focal deficit present.      Mental Status: She is alert and oriented to person, place, and time.   Psychiatric:         Mood and Affect: Mood normal.         Behavior: Behavior normal.            No results found for any visits on 09/10/24.        Signed Electronically by: OKSANA OBRIEN CNP

## 2024-09-13 ENCOUNTER — TELEPHONE (OUTPATIENT)
Dept: FAMILY MEDICINE | Facility: CLINIC | Age: 74
End: 2024-09-13
Payer: MEDICARE

## 2024-09-19 NOTE — TELEPHONE ENCOUNTER
Central Prior Authorization Team   Phone: [phone]    PA Initiation    Medication: Wegovy 0.25 MG/0.5 ML  Insurance Company: [employer] - Phone [phone] Fax [phone]  Pharmacy Filling the Rx: [facility], [place] [address]  Filling Pharmacy Phone: [phone]  Filling Pharmacy Fax:    Start Date: 9/18/2024

## 2024-09-20 NOTE — TELEPHONE ENCOUNTER
PRIOR AUTHORIZATION DENIED    Medication: Wegovy 0.25 MG/0.5 ML    Denial Date: 9/20/2024    Denial Rational: Patient would need to meet ALL criteria listed below:    Patient needs to try  3 months of generic phentermine, benzphetamine, diethylpropion (IR/SR) or phendimetrazine IR/SR and failed to achieve a 5% reduction in baseline weight.  Will need chart notes to support.     Patient engaged in behavioral modification and dietary restriction for at least 6 months and has failed to achieve the desired weight loss, and will remain engaged throughout course of therapy.  Will need to enroll in MOVE!    MOVE! Weight Management Program for Veterans (MOVE!), supported by [facility]otion and Disease Prevention ([facility]). We are proud to make this program available to Veterans. Now in its second decade, MOVE! includes the most up-to-date approaches for weight management. Please explore our website to learn more about MOVE!    BMI greater than or equal to 40    Patient will need to have medication prescribed from a [facility] provider and filled at a participating pharmacy       Appeal Information: Patient has insurance thru [facility].  She would need to request an appeal.

## 2024-12-21 ENCOUNTER — HEALTH MAINTENANCE LETTER (OUTPATIENT)
Age: 74
End: 2024-12-21

## 2025-03-27 ENCOUNTER — TELEPHONE (OUTPATIENT)
Dept: FAMILY MEDICINE | Facility: CLINIC | Age: 75
End: 2025-03-27
Payer: MEDICARE

## 2025-03-27 NOTE — TELEPHONE ENCOUNTER
Patient Quality Outreach    Patient is due for the following:   Physical Annual Wellness Visit    Action(s) Taken:   Patient was scheduled for Annual Wellness    Type of outreach:    Phone, spoke to patient/parent. Spoke with Patient    Questions for provider review:    None         Joon Ramirez  Chart routed to Care Team.

## 2025-04-14 ENCOUNTER — PATIENT OUTREACH (OUTPATIENT)
Dept: CARE COORDINATION | Facility: CLINIC | Age: 75
End: 2025-04-14
Payer: MEDICARE

## 2025-05-01 SDOH — HEALTH STABILITY: PHYSICAL HEALTH: ON AVERAGE, HOW MANY DAYS PER WEEK DO YOU ENGAGE IN MODERATE TO STRENUOUS EXERCISE (LIKE A BRISK WALK)?: 0 DAYS

## 2025-05-01 ASSESSMENT — SOCIAL DETERMINANTS OF HEALTH (SDOH): HOW OFTEN DO YOU GET TOGETHER WITH FRIENDS OR RELATIVES?: TWICE A WEEK

## 2025-05-06 ENCOUNTER — OFFICE VISIT (OUTPATIENT)
Dept: FAMILY MEDICINE | Facility: CLINIC | Age: 75
End: 2025-05-06
Payer: MEDICARE

## 2025-05-06 VITALS
OXYGEN SATURATION: 97 % | RESPIRATION RATE: 16 BRPM | SYSTOLIC BLOOD PRESSURE: 185 MMHG | DIASTOLIC BLOOD PRESSURE: 77 MMHG | TEMPERATURE: 98.7 F | BODY MASS INDEX: 44.82 KG/M2 | HEART RATE: 77 BPM | HEIGHT: 63 IN

## 2025-05-06 DIAGNOSIS — Z12.11 SCREEN FOR COLON CANCER: ICD-10-CM

## 2025-05-06 DIAGNOSIS — G95.9 DISEASE OF SPINAL CORD (H): ICD-10-CM

## 2025-05-06 DIAGNOSIS — Z23 NEED FOR VACCINATION: ICD-10-CM

## 2025-05-06 DIAGNOSIS — I10 ESSENTIAL HYPERTENSION: ICD-10-CM

## 2025-05-06 DIAGNOSIS — D72.819 LEUKOPENIA, UNSPECIFIED TYPE: ICD-10-CM

## 2025-05-06 DIAGNOSIS — E04.1 THYROID NODULE: ICD-10-CM

## 2025-05-06 DIAGNOSIS — N81.4 UTERINE PROLAPSE: ICD-10-CM

## 2025-05-06 DIAGNOSIS — Z12.31 VISIT FOR SCREENING MAMMOGRAM: ICD-10-CM

## 2025-05-06 DIAGNOSIS — Z11.59 NEED FOR HEPATITIS C SCREENING TEST: ICD-10-CM

## 2025-05-06 DIAGNOSIS — E78.5 DYSLIPIDEMIA: ICD-10-CM

## 2025-05-06 DIAGNOSIS — Z00.00 ENCOUNTER FOR MEDICARE ANNUAL WELLNESS EXAM: Primary | ICD-10-CM

## 2025-05-06 DIAGNOSIS — M48.02 STENOSIS OF CERVICAL SPINE WITH MYELOPATHY (H): ICD-10-CM

## 2025-05-06 DIAGNOSIS — Z71.89 ADVANCED DIRECTIVES, COUNSELING/DISCUSSION: ICD-10-CM

## 2025-05-06 DIAGNOSIS — Z78.0 ASYMPTOMATIC POSTMENOPAUSAL STATUS: ICD-10-CM

## 2025-05-06 DIAGNOSIS — G99.2 STENOSIS OF CERVICAL SPINE WITH MYELOPATHY (H): ICD-10-CM

## 2025-05-06 DIAGNOSIS — Z13.1 SCREENING FOR DIABETES MELLITUS: ICD-10-CM

## 2025-05-06 DIAGNOSIS — Z13.220 LIPID SCREENING: ICD-10-CM

## 2025-05-06 PROBLEM — R52 INTRACTABLE PAIN: Status: ACTIVE | Noted: 2019-05-02

## 2025-05-06 LAB
BASOPHILS # BLD AUTO: 0 10E3/UL (ref 0–0.2)
BASOPHILS NFR BLD AUTO: 0 %
EOSINOPHIL # BLD AUTO: 0 10E3/UL (ref 0–0.7)
EOSINOPHIL NFR BLD AUTO: 0 %
ERYTHROCYTE [DISTWIDTH] IN BLOOD BY AUTOMATED COUNT: 14.2 % (ref 10–15)
ERYTHROCYTE [DISTWIDTH] IN BLOOD BY AUTOMATED COUNT: 14.3 % (ref 10–15)
EST. AVERAGE GLUCOSE BLD GHB EST-MCNC: 128 MG/DL
HBA1C MFR BLD: 6.1 % (ref 0–5.6)
HCT VFR BLD AUTO: 43.2 % (ref 35–47)
HCT VFR BLD AUTO: 43.3 % (ref 35–47)
HGB BLD-MCNC: 15.1 G/DL (ref 11.7–15.7)
HGB BLD-MCNC: 15.2 G/DL (ref 11.7–15.7)
IMM GRANULOCYTES # BLD: 0 10E3/UL
IMM GRANULOCYTES NFR BLD: 1 %
LYMPHOCYTES # BLD AUTO: 1.2 10E3/UL (ref 0.8–5.3)
LYMPHOCYTES NFR BLD AUTO: 39 %
MCH RBC QN AUTO: 33.6 PG (ref 26.5–33)
MCH RBC QN AUTO: 33.6 PG (ref 26.5–33)
MCHC RBC AUTO-ENTMCNC: 35 G/DL (ref 31.5–36.5)
MCHC RBC AUTO-ENTMCNC: 35.1 G/DL (ref 31.5–36.5)
MCV RBC AUTO: 96 FL (ref 78–100)
MCV RBC AUTO: 96 FL (ref 78–100)
MONOCYTES # BLD AUTO: 0.5 10E3/UL (ref 0–1.3)
MONOCYTES NFR BLD AUTO: 15 %
NEUTROPHILS # BLD AUTO: 1.4 10E3/UL (ref 1.6–8.3)
NEUTROPHILS NFR BLD AUTO: 44 %
PLATELET # BLD AUTO: 132 10E3/UL (ref 150–450)
PLATELET # BLD AUTO: 132 10E3/UL (ref 150–450)
RBC # BLD AUTO: 4.49 10E6/UL (ref 3.8–5.2)
RBC # BLD AUTO: 4.52 10E6/UL (ref 3.8–5.2)
WBC # BLD AUTO: 3 10E3/UL (ref 4–11)
WBC # BLD AUTO: 3.1 10E3/UL (ref 4–11)

## 2025-05-06 PROCEDURE — G0439 PPPS, SUBSEQ VISIT: HCPCS | Performed by: NURSE PRACTITIONER

## 2025-05-06 PROCEDURE — 36415 COLL VENOUS BLD VENIPUNCTURE: CPT | Performed by: NURSE PRACTITIONER

## 2025-05-06 PROCEDURE — 80053 COMPREHEN METABOLIC PANEL: CPT | Performed by: NURSE PRACTITIONER

## 2025-05-06 PROCEDURE — 83036 HEMOGLOBIN GLYCOSYLATED A1C: CPT | Performed by: NURSE PRACTITIONER

## 2025-05-06 PROCEDURE — 3078F DIAST BP <80 MM HG: CPT | Performed by: NURSE PRACTITIONER

## 2025-05-06 PROCEDURE — 80061 LIPID PANEL: CPT | Performed by: NURSE PRACTITIONER

## 2025-05-06 PROCEDURE — 84443 ASSAY THYROID STIM HORMONE: CPT | Performed by: NURSE PRACTITIONER

## 2025-05-06 PROCEDURE — 3077F SYST BP >= 140 MM HG: CPT | Performed by: NURSE PRACTITIONER

## 2025-05-06 PROCEDURE — 86803 HEPATITIS C AB TEST: CPT | Performed by: NURSE PRACTITIONER

## 2025-05-06 PROCEDURE — 99214 OFFICE O/P EST MOD 30 MIN: CPT | Mod: 25 | Performed by: NURSE PRACTITIONER

## 2025-05-06 PROCEDURE — 85025 COMPLETE CBC W/AUTO DIFF WBC: CPT | Performed by: NURSE PRACTITIONER

## 2025-05-06 RX ORDER — LISINOPRIL 10 MG/1
10 TABLET ORAL DAILY
Qty: 30 TABLET | Refills: 0 | Status: SHIPPED | OUTPATIENT
Start: 2025-05-06

## 2025-05-06 NOTE — PROGRESS NOTES
Preventive Care Visit  [facility] OKSANA Preston CNP, Family Medicine  May 6, 2025  {Provider  Link to SmartSet :006827}    {PROVIDER CHARTING PREFERENCE:125710}    Niraj Harper is a 74 year old, presenting for the following:  Wellness Visit        5/6/2025     2:05 PM   Additional Questions   Roomed by Landy GAINES   {ROOMER positive Fall Risk- Gait Speed Test is required click here to document the Gait Speed Test and then refresh the note to pull in results  :134368}  {ROOMER if patient is in their first year of Medicare a vision screen is required click here to document the Vison screen and then refresh the note to pull in results  :854433}      HPI  ***   {MA/LPN/RN Pre-Provider Visit Orders- hCG/UA/Strep (Optional):477529}  {SUPERLIST (Optional):426423}  {additonal problems for provider to add (Optional):605559}  Advance Care Planning  {The storyboard will display whether the patient has ACP docs on file. Hover over the Code section in the storyboard to access the ACP documents. :050515}  {(AWV REQUIRED) Advance Care Planning Reviewed:456763}        5/1/2025   General Health   How would you rate your overall physical health? Good   Feel stress (tense, anxious, or unable to sleep) Not at all         5/1/2025   Nutrition   Diet: Other   If other, please elaborate: Weight Watchers strategy         5/1/2025   Exercise   Days per week of moderate/strenous exercise 0 days   (!) EXERCISE CONCERN      5/1/2025   Social Factors   Frequency of gathering with friends or relatives Twice a week   Worry food won't last until get money to buy more No   Food not last or not have enough money for food? No   Do you have housing? (Housing is defined as stable permanent housing and does not include staying outside in a car, in a tent, in an abandoned building, in an overnight shelter, or couch-surfing.) Yes   Are you worried about losing your housing? No   Lack of transportation? No   Unable  to get utilities (heat,electricity)? No         5/1/2025   Fall Risk   Fallen 2 or more times in the past year? No   Trouble with walking or balance? Yes     {Positive Fall Risk- Gait Speed Test is required and was not documented before note was started.  If results do not appear, ask staff to complete.  Once completed, refresh note to pull in results Click here to link Gait Speed Test  :911557}      5/1/2025   Activities of Daily Living- Home Safety   Needs help with the following daily activites Shopping    Housework   Safety concerns in the home None of the above       Multiple values from one day are sorted in reverse-chronological order         5/1/2025   Dental   Dentist two times every year? Yes         5/1/2025   Hearing Screening   Hearing concerns? (!) I NEED TO ASK PEOPLE TO SPEAK UP OR REPEAT THEMSELVES.    (!) IT'S HARD TO FOLLOW A CONVERSATION IN A NOISY RESTAURANT OR CROWDED ROOM.    (!) TROUBLE UNDERSTANDING SOFT OR WHISPERED SPEECH.       Multiple values from one day are sorted in reverse-chronological order         5/1/2025   Driving Risk Screening   Patient/family members have concerns about driving No         5/1/2025   General Alertness/Fatigue Screening   Have you been more tired than usual lately? (!) YES         5/1/2025   Urinary Incontinence Screening   Bothered by leaking urine in past 6 months No         Today's PHQ-2 Score:       5/6/2025     1:59 PM   PHQ-2 ( 1999 Pfizer)   Q1: Little interest or pleasure in doing things 1   Q2: Feeling down, depressed or hopeless 0   PHQ-2 Score 1    Q1: Little interest or pleasure in doing things Several days   Q2: Feeling down, depressed or hopeless Not at all   PHQ-2 Score 1       Patient-reported           5/1/2025   Substance Use   Alcohol more than 3/day or more than 7/wk Not Applicable   Do you have a current opioid prescription? (!) YES   How severe/bad is pain from 1 to 10? 7/10   Do you use any other substances recreationally? No   {Provider   Link to Opioid Risk Tool  Assess risk of opioid use disorder :628842}  {AWV REQUIRED- Pull in ORT Results:457796}  Social History     Tobacco Use    Smoking status: Never     Passive exposure: Never    Smokeless tobacco: Never   Vaping Use    Vaping status: Never Used     {Provider  If there are gaps in the social history shown above, please follow the link to update and then refresh the note Link to Social and Substance History :806706}     {Mammogram Decision Support (Optional):595441}    ASCVD Risk   The 10-year ASCVD risk score (Alejandro PIKE, et al., 2019) is: 21%    Values used to calculate the score:      Age: 74 years      Sex: Female      Is Non- : No      Diabetic: No      Tobacco smoker: No      Systolic Blood Pressure: 156 mmHg      Is BP treated: No      HDL Cholesterol: 36 mg/dL      Total Cholesterol: 222 mg/dL    {Link to Fracture Risk Assessment Tool (Optional):214658}    {Provider  REQUIRED FOR AWV Use the storyboard to review patient history, after sections have been marked as reviewed, refresh note to capture documentation:908306}  {Provider   REQUIRED AWV use this link to review and update sexual activity history  after section has been marked as reviewed, refresh note to capture documentation:661903}  Reviewed and updated as needed this visit by Provider                    {HISTORY OPTIONS (Optional):366448}  Current providers sharing in care for this patient include:  Patient Care Team:  Felicia Mccarthy APRN CNP as PCP - General (Family Medicine)  Felicia Mccarthy APRN CNP as Assigned PCP    The following health maintenance items are reviewed in Epic and correct as of today:  Health Maintenance   Topic Date Due    DEXA  Never done    MAMMO SCREENING  Never done    COLORECTAL CANCER SCREENING  Never done    HEPATITIS C SCREENING  Never done    ZOSTER IMMUNIZATION (1 of 2) Never done    Pneumococcal Vaccine: 50+ Years (2 of 2 - PCV) 07/02/2022     "DTAP/TDAP/TD IMMUNIZATION (3 - Td or Tdap) 09/01/2023    MEDICARE ANNUAL WELLNESS VISIT  10/16/2024    ANNUAL REVIEW OF HM ORDERS  10/16/2024    COVID-19 Vaccine (7 - 2024-25 season) 04/26/2025    RSV VACCINE (1 - 1-dose 75+ series) 09/06/2025    FALL RISK ASSESSMENT  05/06/2026    DIABETES SCREENING  10/16/2026    LIPID  10/16/2028    ADVANCE CARE PLANNING  10/20/2028    PHQ-2 (once per calendar year)  Completed    INFLUENZA VACCINE  Completed    HPV IMMUNIZATION  Aged Out    MENINGITIS IMMUNIZATION  Aged Out       {ROS Picklists (Optional):397543}     Objective    Exam  There were no vitals taken for this visit.   Estimated body mass index is 44.82 kg/m  as calculated from the following:    Height as of 10/16/23: 1.6 m (5' 3\").    Weight as of 10/16/23: 114.8 kg (253 lb).    Physical Exam  {Exam Choices (Optional):813377}  {Provider  The Mini-Cog is incomplete, use link to complete and refresh note Link to Mini-Cog :801567}       No data to display              {A Mini-Cog total score of 0-2 suggests the possibility of dementia, score of 3-5 suggests no dementia:385616}         Signed Electronically by: OKSANA Edwards CNP  {Email feedback regarding this note to [email or web address]   :518490}  "

## 2025-05-06 NOTE — PROGRESS NOTES
Preventive Care Visit  [facility] OKSANA Preston CNP, Family Medicine  May 6, 2025  {Provider  Link to SmartSet :923235}    {PROVIDER CHARTING PREFERENCE:023898}    Niraj Harper is a 74 year old, presenting for the following:  Wellness Visit (Wegovy discussion )    - noting prolapse, last seen with prior primary care provider. Had 10 kids. Difficulty starting stream or urine, sometimes stool. No urgency or incontinence. Muscle spasms in legs (twitches) that will occur when bladder is full, can be worse at night. Not emptying bladder.   - taking Advil 200 mg at least daily. Tylenol daily for pain control. 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 Has spinal cord stimulator, has been helpful. Seeing neurology. Ho 4-5 fusion. Can have tingling in hands too, noted to have compression C3-4. Taking cannibals gummies/medical.    - thyroid nodule, on left side. No pain.   - BP home 150-180/70's. No leg swelling that is new. Weight is the same. No chest pains or sob.   -     - she is not driving for 4-5 years related to safety, numbness.         5/6/2025     2:05 PM   Additional Questions   Roomed by Landy GAINES     {ROOMER if patient is in their first year of Medicare a vision screen is required click here to document the Vison screen and then refresh the note to pull in results  :962440}      HPI  ***   {MA/LPN/RN Pre-Provider Visit Orders- hCG/UA/Strep (Optional):708213}  {SUPERLIST (Optional):337469}  {additonal problems for provider to add (Optional):440357}  Advance Care Planning  {The storyboard will display whether the patient has ACP docs on file. Hover over the Code section in the storyboard to access the ACP documents. :333899}  Discussed advance care planning with patient; informed AVS has link to Honoring Choices.        5/1/2025   General Health   How would you rate your overall physical health? Good   Feel stress (tense, anxious, or unable to sleep) Not at all          5/1/2025   Nutrition   Diet: Other   If other, please elaborate: Weight Watchers strategy         5/1/2025   Exercise   Days per week of moderate/strenous exercise 0 days   (!) EXERCISE CONCERN      5/1/2025   Social Factors   Frequency of gathering with friends or relatives Twice a week   Worry food won't last until get money to buy more No   Food not last or not have enough money for food? No   Do you have housing? (Housing is defined as stable permanent housing and does not include staying outside in a car, in a tent, in an abandoned building, in an overnight shelter, or couch-surfing.) Yes   Are you worried about losing your housing? No   Lack of transportation? No   Unable to get utilities (heat,electricity)? No         5/6/2025   Fall Risk   Reason Gait Speed Test Not Completed Patient declines          5/1/2025   Activities of Daily Living- Home Safety   Needs help with the following daily activites Shopping    Housework   Safety concerns in the home None of the above       Multiple values from one day are sorted in reverse-chronological order         5/1/2025   Dental   Dentist two times every year? Yes         5/1/2025   Hearing Screening   Hearing concerns? (!) I NEED TO ASK PEOPLE TO SPEAK UP OR REPEAT THEMSELVES.    (!) IT'S HARD TO FOLLOW A CONVERSATION IN A NOISY RESTAURANT OR CROWDED ROOM.    (!) TROUBLE UNDERSTANDING SOFT OR WHISPERED SPEECH.       Multiple values from one day are sorted in reverse-chronological order         5/1/2025   Driving Risk Screening   Patient/family members have concerns about driving No         5/1/2025   General Alertness/Fatigue Screening   Have you been more tired than usual lately? (!) YES         5/1/2025   Urinary Incontinence Screening   Bothered by leaking urine in past 6 months No         Today's PHQ-2 Score:       5/6/2025     1:59 PM   PHQ-2 ( 1999 Pfizer)   Q1: Little interest or pleasure in doing things 1   Q2: Feeling down, depressed or hopeless 0    PHQ-2 Score 1    Q1: Little interest or pleasure in doing things Several days   Q2: Feeling down, depressed or hopeless Not at all   PHQ-2 Score 1       Patient-reported           5/1/2025   Substance Use   Alcohol more than 3/day or more than 7/wk Not Applicable   Do you have a current opioid prescription? (!) YES   How severe/bad is pain from 1 to 10? 7/10   Do you use any other substances recreationally? No   {Provider  Link to Opioid Risk Tool  Assess risk of opioid use disorder :881537}  {AWV REQUIRED- Pull in ORT Results:659979}  Social History     Tobacco Use    Smoking status: Never     Passive exposure: Never    Smokeless tobacco: Never   Vaping Use    Vaping status: Never Used     {Provider  If there are gaps in the social history shown above, please follow the link to update and then refresh the note Link to Social and Substance History :870892}     {Mammogram Decision Support (Optional):933660}    ASCVD Risk   The 10-year ASCVD risk score (Alejandro PIKE, et al., 2019) is: 31.1%    Values used to calculate the score:      Age: 74 years      Sex: Female      Is Non- : No      Diabetic: No      Tobacco smoker: No      Systolic Blood Pressure: 197 mmHg      Is BP treated: No      HDL Cholesterol: 36 mg/dL      Total Cholesterol: 222 mg/dL    {Link to Fracture Risk Assessment Tool (Optional):640903}    {Provider  REQUIRED FOR AWV Use the storyboard to review patient history, after sections have been marked as reviewed, refresh note to capture documentation:554796}  {Provider   REQUIRED AWV use this link to review and update sexual activity history  after section has been marked as reviewed, refresh note to capture documentation:147806}  Reviewed and updated as needed this visit by Provider                    {HISTORY OPTIONS (Optional):567586}  Current providers sharing in care for this patient include:  Patient Care Team:  Felicia Mccarthy APRN CNP as PCP - General  "(Family Medicine)  Felicia Mccarthy APRN CNP as Assigned PCP    The following health maintenance items are reviewed in Epic and correct as of today:  Health Maintenance   Topic Date Due    DEXA  Never done    MAMMO SCREENING  Never done    COLORECTAL CANCER SCREENING  Never done    HEPATITIS C SCREENING  Never done    ZOSTER IMMUNIZATION (1 of 2) Never done    Pneumococcal Vaccine: 50+ Years (2 of 2 - PCV) 07/02/2022    DTAP/TDAP/TD IMMUNIZATION (3 - Td or Tdap) 09/01/2023    MEDICARE ANNUAL WELLNESS VISIT  10/16/2024    ANNUAL REVIEW OF HM ORDERS  10/16/2024    COVID-19 Vaccine (7 - 2024-25 season) 04/26/2025    RSV VACCINE (1 - 1-dose 75+ series) 09/06/2025    FALL RISK ASSESSMENT  05/06/2026    DIABETES SCREENING  10/16/2026    LIPID  10/16/2028    ADVANCE CARE PLANNING  10/20/2028    PHQ-2 (once per calendar year)  Completed    INFLUENZA VACCINE  Completed    HPV IMMUNIZATION  Aged Out    MENINGITIS IMMUNIZATION  Aged Out       {ROS Picklists (Optional):414224}     Objective    Exam  BP (!) 197/97   Pulse 77   Temp 98.7  F (37.1  C) (Temporal)   Resp 16   Ht 1.6 m (5' 3\")   SpO2 97%   BMI 44.82 kg/m     Estimated body mass index is 44.82 kg/m  as calculated from the following:    Height as of this encounter: 1.6 m (5' 3\").    Weight as of 10/16/23: 114.8 kg (253 lb).    Physical Exam  {Exam Choices (Optional):008924}  {Provider  The Mini-Cog is incomplete, use link to complete and refresh note Link to Mini-Cog :367382}      5/6/2025   Mini Cog   Mini-Cog Not Completed (choose reason) Patient declines     {A Mini-Cog total score of 0-2 suggests the possibility of dementia, score of 3-5 suggests no dementia:375397}         Signed Electronically by: OKSANA Edwards CNP  {Email feedback regarding this note to [email or web address]   :318469}  " (4-7)  High Risk (>8)  Social History     Tobacco Use    Smoking status: Never     Passive exposure: Never    Smokeless tobacco: Never   Vaping Use    Vaping status: Never Used              ASCVD Risk   The 10-year ASCVD risk score (Alejandro PIKE et al., 2019) is: 37%    Values used to calculate the score:      Age: 74 years      Sex: Female      Is Non- : No      Diabetic: No      Tobacco smoker: No      Systolic Blood Pressure: 185 mmHg      Is BP treated: Yes      HDL Cholesterol: 37 mg/dL      Total Cholesterol: 246 mg/dL    Fracture Risk Assessment Tool  Link to Frax Calculator  Use the information below to complete the Frax calculator  : 1950  Sex: female  Weight (kg): Patient weight not available.  Height (cm): 160 cm  Previous Fragility Fracture:  No  History of parent with fractured hip:  No  Current Smoking:  No  Patient has been on glucocorticoids for more than 3 months (5mg/day or more): No  Rheumatoid Arthritis on Problem List:  No  Secondary Osteoporosis on Problem List:  No  Consumes 3 or more units of alcohol per day: No  Femoral Neck BMD (g/cm2)            Reviewed and updated as needed this visit by Provider                    No past medical history on file.  No past surgical history on file.  Current providers sharing in care for this patient include:  Patient Care Team:  Felicia Mccarthy APRN CNP as PCP - General (Family Medicine)  Felicia Mccarthy APRN CNP as Assigned PCP    The following health maintenance items are reviewed in Epic and correct as of today:  Health Maintenance   Topic Date Due    DEXA  Never done    MAMMO SCREENING  Never done    COLORECTAL CANCER SCREENING  Never done    HEPATITIS C SCREENING  Never done    ZOSTER IMMUNIZATION (1 of 2) Never done    Pneumococcal Vaccine: 50+ Years (2 of 2 - PCV) 2022    DTAP/TDAP/TD IMMUNIZATION (3 - Td or Tdap) 2023    MEDICARE ANNUAL WELLNESS VISIT  10/16/2024    ANNUAL REVIEW OF HM  "ORDERS  10/16/2024    COVID-19 Vaccine (7 - 2024-25 season) 04/26/2025    RSV VACCINE (1 - 1-dose 75+ series) 09/06/2025    FALL RISK ASSESSMENT  05/06/2026    DIABETES SCREENING  10/16/2026    LIPID  10/16/2028    ADVANCE CARE PLANNING  10/20/2028    PHQ-2 (once per calendar year)  Completed    INFLUENZA VACCINE  Completed    HPV IMMUNIZATION  Aged Out    MENINGITIS IMMUNIZATION  Aged Out         Objective    Exam  BP (!) 197/97   Pulse 77   Temp 98.7  F (37.1  C) (Temporal)   Resp 16   Ht 1.6 m (5' 3\")   SpO2 97%   BMI 44.82 kg/m     Estimated body mass index is 44.82 kg/m  as calculated from the following:    Height as of this encounter: 1.6 m (5' 3\").    Weight as of 10/16/23: 114.8 kg (253 lb).    Physical Exam  GENERAL: alert and no distress  EYES: Eyes grossly normal to inspection, PERRL and conjunctivae and sclerae normal  NECK: no adenopathy, no asymmetry, masses, or scars  RESP: lungs clear to auscultation - no rales, rhonchi or wheezes  CV: regular rate and rhythm, normal S1 S2, no S3 or S4, no murmur, click or rub, no peripheral edema  MS: no gross musculoskeletal defects noted, no edema  SKIN: no suspicious lesions or rashes  NEURO: Normal strength and tone, mentation intact and speech normal  PSYCH: mentation appears normal, affect normal/bright        5/6/2025   Mini Cog   Mini-Cog Not Completed (choose reason) Patient declines              Signed Electronically by: OKSANA Edwards CNP    "

## 2025-05-07 ENCOUNTER — PATIENT OUTREACH (OUTPATIENT)
Dept: CARE COORDINATION | Facility: CLINIC | Age: 75
End: 2025-05-07
Payer: MEDICARE

## 2025-05-07 LAB
ALBUMIN SERPL BCG-MCNC: 4.5 G/DL (ref 3.5–5.2)
ALP SERPL-CCNC: 59 U/L (ref 40–150)
ALT SERPL W P-5'-P-CCNC: 16 U/L (ref 0–50)
ANION GAP SERPL CALCULATED.3IONS-SCNC: 9 MMOL/L (ref 7–15)
AST SERPL W P-5'-P-CCNC: 23 U/L (ref 0–45)
BILIRUB SERPL-MCNC: 0.4 MG/DL
BUN SERPL-MCNC: 18.2 MG/DL (ref 8–23)
CALCIUM SERPL-MCNC: 9.8 MG/DL (ref 8.8–10.4)
CHLORIDE SERPL-SCNC: 103 MMOL/L (ref 98–107)
CHOLEST SERPL-MCNC: 246 MG/DL
CREAT SERPL-MCNC: 0.74 MG/DL (ref 0.51–0.95)
EGFRCR SERPLBLD CKD-EPI 2021: 84 ML/MIN/1.73M2
FASTING STATUS PATIENT QL REPORTED: NO
FASTING STATUS PATIENT QL REPORTED: NO
GLUCOSE SERPL-MCNC: 124 MG/DL (ref 70–99)
HCO3 SERPL-SCNC: 28 MMOL/L (ref 22–29)
HCV AB SERPL QL IA: NONREACTIVE
HDLC SERPL-MCNC: 37 MG/DL
LDLC SERPL CALC-MCNC: 152 MG/DL
NONHDLC SERPL-MCNC: 209 MG/DL
POTASSIUM SERPL-SCNC: 5.1 MMOL/L (ref 3.4–5.3)
PROT SERPL-MCNC: 7.4 G/DL (ref 6.4–8.3)
SODIUM SERPL-SCNC: 140 MMOL/L (ref 135–145)
TRIGL SERPL-MCNC: 287 MG/DL
TSH SERPL DL<=0.005 MIU/L-ACNC: 1.45 UIU/ML (ref 0.3–4.2)

## 2025-05-07 NOTE — PROGRESS NOTES
Clinic Care Coordination Contact  [facility]/Voicemail    Clinical Data: Care Coordinator Outreach    Outreach Documentation Number of Outreach Attempt   5/7/2025  10:00 AM 1       Left message on patient's voicemail with call back information and requested return call.      Plan: Care Coordinator will try to reach patient again in 1-2 business days.    DEANNA Sierra  [phone]  [facility]

## 2025-05-07 NOTE — LETTER
[facility] CARE COORDINATION  [address] [address]    May 8, 2025    Meredith Galvez  [address] [address]      Dear Meredith,    I am a clinic community health worker who works with OKSANA Edwards CNP with the [facility]. I have been trying to reach you recently to introduce Clinic Care Coordination. Below is a description of clinic care coordination and how we can further assist you.       The clinic care coordination team is made up of a registered nurse, , financial resource worker and community health worker who understand the health care system. The goal of clinic care coordination is to help you manage your health and improve access to the health care system. Our team works alongside your provider to assist you in determining your health and social needs. We can help you obtain health care and community resources, providing you with necessary information and education. We can work with you through any barriers and develop a care plan that helps coordinate and strengthen the communication between you and your care team.  Our services are voluntary and are offered without charge to you personally.    Please feel free to contact Fiordaliza at [phone] with any questions or concerns regarding care coordination and what we can offer.      We are focused on providing you with the highest-quality healthcare experience possible.    Sincerely,     DEANNA Sierra  [facility]  [facility]

## 2025-05-08 NOTE — PROGRESS NOTES
Clinic Care Coordination Contact  [facility]/Voicemail    Clinical Data: Care Coordinator Outreach    Outreach Documentation Number of Outreach Attempt   5/7/2025  10:00 AM 1   5/8/2025  10:31 AM 2       Left message on patient's voicemail with call back information and requested return call.      Plan: Care Coordinator will send care coordination introduction letter with care coordinator contact information and explanation of care coordination services via [email or web address]. Care Coordinator will do no further outreaches at this time.    URI SierraW  [phone]  [facility] Care Resource [facility]

## 2025-05-13 ENCOUNTER — TELEPHONE (OUTPATIENT)
Dept: FAMILY MEDICINE | Facility: CLINIC | Age: 75
End: 2025-05-13
Payer: MEDICARE

## 2025-05-13 ENCOUNTER — RESULTS FOLLOW-UP (OUTPATIENT)
Dept: FAMILY MEDICINE | Facility: CLINIC | Age: 75
End: 2025-05-13
Payer: MEDICARE

## 2025-05-13 RX ORDER — ATORVASTATIN CALCIUM 40 MG/1
40 TABLET, FILM COATED ORAL DAILY
Qty: 90 TABLET | Refills: 3 | Status: SHIPPED | OUTPATIENT
Start: 2025-05-13 | End: 2025-05-13

## 2025-05-13 NOTE — TELEPHONE ENCOUNTER
Prior Authorization Retail Medication Request    Medication/Dose: Semaglutide-Weight Management (WEGOVY) 0.5 MG/0.5ML pen  Diagnosis and ICD code (if different than what is on RX):  see chart  New/renewal/insurance change PA/secondary ins. PA:  Previously Tried and Failed:  see chart  Rationale:  see chart    Insurance   Primary: Medicare  Insurance ID:  [id number]     Secondary (if applicable):/   Insurance ID:  [id number]       Clinic Information  Preferred routing pool for dept communication: [place]  Nurse [facility]

## 2025-05-13 NOTE — PATIENT INSTRUCTIONS
GLP-1/GIP medications:    Nausea is the most common side effect and more typical within 36 hours of taking the medication.      Wegovy pens are single use and should be stored in the fridge until dose is administered. Should be administered once weekly in abdomen (at least 2 inches from belly button) or in top outer thigh.     Reviewed mechanism of action and expected results. On average, adults lost ~15% of total body weight. One-third of patients in studies lost 20% or more, especially when combined with reduced calorie meal plan and increased physical activity.     --The goal is about 1-1.5 lbs per week, not more. If you lose more, than you might be losing muscle. We will not titrate up if the current dose is effective. Will titrate up if you plateau on that dose. If you can not get at least 80 gm of protein in per day, then the dose might need to be decreased. It is paramount to avoid losing muscle mass.     To aide in preventing lean muscle loss; eat at least 30 gm of protein per meal, about  gm per day. The can be divided throughout the day. Remember to stay hydrated to prevent dehydration with water. Also 25 gm of fiber per day encouraged.

## 2025-05-16 ENCOUNTER — TELEPHONE (OUTPATIENT)
Dept: FAMILY MEDICINE | Facility: CLINIC | Age: 75
End: 2025-05-16
Payer: MEDICARE

## 2025-05-16 DIAGNOSIS — E66.813 CLASS 3 OBESITY (H): ICD-10-CM

## 2025-05-16 DIAGNOSIS — E78.5 DYSLIPIDEMIA: ICD-10-CM

## 2025-05-16 DIAGNOSIS — I10 ESSENTIAL HYPERTENSION: ICD-10-CM

## 2025-05-16 DIAGNOSIS — R73.03 PREDIABETES: ICD-10-CM

## 2025-05-16 NOTE — TELEPHONE ENCOUNTER
05/16/25  General Call      Reason for Call:  Request for the sublingual semaglutide    What are your questions or concerns:  Pt called to say that she would like to try the sublingual semaglitude through the [facility] because she does not think her insurance will cover the Wegovy.    Pt also asked writer to send pt a [employer] message with the information for the [facility]. Writer sent.     Provider please order medication as appropriate.    Date of last appointment with provider: 05/06/25    Could we send this information to you in [employer] or would you prefer to receive a phone call?:   Patient would prefer a phone call

## 2025-05-16 NOTE — TELEPHONE ENCOUNTER
Retail Pharmacy Prior Authorization Team   Phone: [phone]    Hello, I have reached out to [employer] to initiate PA. PA Rep Amelia EASON explained the submission process is different than most commercial plans.   Insurance requires a LMN and treatment plan faxed to them for initiation. Please place LMN in letters tab and route this TE back to me when completed so I can submit the request.   Thank you!  Hanna [facility]al [facility] Team      Fax LMN with treatment plan to  [facility]  FAX: [phone]

## 2025-05-19 ENCOUNTER — LAB (OUTPATIENT)
Dept: LAB | Facility: CLINIC | Age: 75
End: 2025-05-19
Payer: MEDICARE

## 2025-05-19 DIAGNOSIS — D72.819 LEUKOPENIA, UNSPECIFIED TYPE: ICD-10-CM

## 2025-05-19 LAB
BASOPHILS # BLD AUTO: 0 10E3/UL (ref 0–0.2)
BASOPHILS NFR BLD AUTO: 0 %
EOSINOPHIL # BLD AUTO: 0 10E3/UL (ref 0–0.7)
EOSINOPHIL NFR BLD AUTO: 0 %
ERYTHROCYTE [DISTWIDTH] IN BLOOD BY AUTOMATED COUNT: 14.1 % (ref 10–15)
HCT VFR BLD AUTO: 45 % (ref 35–47)
HGB BLD-MCNC: 15.9 G/DL (ref 11.7–15.7)
IMM GRANULOCYTES # BLD: 0 10E3/UL
IMM GRANULOCYTES NFR BLD: 1 %
LYMPHOCYTES # BLD AUTO: 1.6 10E3/UL (ref 0.8–5.3)
LYMPHOCYTES NFR BLD AUTO: 41 %
MCH RBC QN AUTO: 33.5 PG (ref 26.5–33)
MCHC RBC AUTO-ENTMCNC: 35.3 G/DL (ref 31.5–36.5)
MCV RBC AUTO: 95 FL (ref 78–100)
MONOCYTES # BLD AUTO: 0.7 10E3/UL (ref 0–1.3)
MONOCYTES NFR BLD AUTO: 17 %
NEUTROPHILS # BLD AUTO: 1.6 10E3/UL (ref 1.6–8.3)
NEUTROPHILS NFR BLD AUTO: 41 %
PLATELET # BLD AUTO: 153 10E3/UL (ref 150–450)
RBC # BLD AUTO: 4.75 10E6/UL (ref 3.8–5.2)
RETICS # AUTO: 0.19 10E6/UL (ref 0.03–0.1)
RETICS/RBC NFR AUTO: 3.9 % (ref 0.5–2)
WBC # BLD AUTO: 3.9 10E3/UL (ref 4–11)

## 2025-05-19 PROCEDURE — 85045 AUTOMATED RETICULOCYTE COUNT: CPT

## 2025-05-19 PROCEDURE — 85025 COMPLETE CBC W/AUTO DIFF WBC: CPT

## 2025-05-19 PROCEDURE — 99207 BLOOD MORPHOLOGY PATHOLOGIST REVIEW: CPT | Performed by: PATHOLOGY

## 2025-05-19 PROCEDURE — 36415 COLL VENOUS BLD VENIPUNCTURE: CPT

## 2025-05-20 LAB
PATH REPORT.COMMENTS IMP SPEC: NORMAL
PATH REPORT.COMMENTS IMP SPEC: NORMAL
PATH REPORT.FINAL DX SPEC: NORMAL
PATH REPORT.MICROSCOPIC SPEC OTHER STN: NORMAL
PATH REPORT.MICROSCOPIC SPEC OTHER STN: NORMAL

## 2025-05-23 ENCOUNTER — RESULTS FOLLOW-UP (OUTPATIENT)
Dept: FAMILY MEDICINE | Facility: CLINIC | Age: 75
End: 2025-05-23

## 2025-05-23 DIAGNOSIS — D72.819 LEUKOPENIA, UNSPECIFIED TYPE: Primary | ICD-10-CM

## 2025-05-23 NOTE — RESULT ENCOUNTER NOTE
Hi     Good news! Pathology result does not appear too alarming. Stay hydrated and repeat CBC in 6 weeks.     I added on a B12 level as well.     Kristina

## 2025-07-14 ENCOUNTER — PATIENT OUTREACH (OUTPATIENT)
Dept: CARE COORDINATION | Facility: CLINIC | Age: 75
End: 2025-07-14
Payer: MEDICARE